# Patient Record
Sex: MALE | URBAN - METROPOLITAN AREA
[De-identification: names, ages, dates, MRNs, and addresses within clinical notes are randomized per-mention and may not be internally consistent; named-entity substitution may affect disease eponyms.]

---

## 2019-03-28 ENCOUNTER — OFFICE VISIT (OUTPATIENT)
Dept: FAMILY MEDICINE | Facility: CLINIC | Age: 44
End: 2019-03-28

## 2019-03-28 VITALS
OXYGEN SATURATION: 99 % | DIASTOLIC BLOOD PRESSURE: 77 MMHG | HEART RATE: 78 BPM | BODY MASS INDEX: 25.65 KG/M2 | SYSTOLIC BLOOD PRESSURE: 122 MMHG | WEIGHT: 139.4 LBS | RESPIRATION RATE: 16 BRPM | HEIGHT: 62 IN | TEMPERATURE: 98.8 F

## 2019-03-28 DIAGNOSIS — J30.2 SEASONAL ALLERGIC RHINITIS: Primary | ICD-10-CM

## 2019-03-28 RX ORDER — FLUTICASONE PROPIONATE 50 MCG
1 SPRAY, SUSPENSION (ML) NASAL DAILY
Qty: 16 G | Refills: 0 | Status: SHIPPED | OUTPATIENT
Start: 2019-03-28

## 2019-03-28 SDOH — HEALTH STABILITY: MENTAL HEALTH: HOW OFTEN DO YOU HAVE A DRINK CONTAINING ALCOHOL?: NEVER

## 2019-03-28 ASSESSMENT — MIFFLIN-ST. JEOR: SCORE: 1413.56

## 2019-03-29 NOTE — PATIENT INSTRUCTIONS
Resumen de la Visita    Nombre del Paciente: Gomez Martínez  MRN: [id number]    Fecha de la [place]: 3/28/2019    Dianostico medico principal: alergias del nariz    Recomendaciones/Instrucciones del Medico: 1) inhalar Flonase og vez en cada fosa nasa en la manana. Sonarse la nariz antes que usa la Flonase use. [place] para 2 semanas o hasta sequita los sintomas.    2) Ravinder un diario de comida para jayson si algunas comidas semolesta. Serecomenda hacer ejercisios, envite el alcohol, refrescos, y simona mucho agua.  Regresa a la clinica si los sintomas empeoran.  Tiene un andrea para jayson el nutriologo rapidamente en el futuro si usted quiere.      Estudios de Laboratorio: ninguna pruebas    Seguimiento/Resultados: Regresa si los sintomas empeoran    Referencias e Instrucciones:  Francisco el exam, encontraron un pedaso de tejido en el parte posterior de la garganta.  La recomendacion es para jayson un especialista.  Llama [facility] para og nadira.  El chris de telefono es [phone] y pregunta sobre servicios ENT (dortor de la garganta, nariz, y orejas) .    Medical Clinicians: Maru John, MS1 and CLARENCE Tao  Medico: Dr. Brittanie Lainez

## 2019-03-29 NOTE — NURSING NOTE
Pt's first visit to clinic. Complains of upper respiratory infection lasting ~8 days with dry cough and runny nose. Has been getting worse over last three days. Nonproductive cough which worsens when laying down, has been propping up his head at night. No fever, no body aches. Complains of headache and throat pain. Also complains of stomach pain and recent constipation lasting ~3 days. Reports that bowel movements hurt. Denies bleeding. Denies recent diet changes. Patient denies any other medical history and takes no medications. He has not been taking any OTC meds for his cold.    Patient moved from [place] at the beginning of March 2019, has only been in [place] for 25 days. He is staying with friends, hasn't found work yet, feels safe where he is staying.     Pt smoked 1 pack a day for 20 years, quit two months ago. Quit drinking 4 years ago. 2-3 cups of coffee a day. Does not use illicit drugs. PHQ-2 of 2, reports that he feels disinterested and sad because he has been so sick.    Anna Draper SN

## 2019-03-29 NOTE — PROGRESS NOTES
"MEDICINE NOTE    SUBJECTIVE:    Gomez Martínez is an otherwise healthy 43-year-old male who presents to clinic today with two concerns: cough and constipation. The patient reports a five year history of intermittent, dry cough that he thinks began after receiving an influenza vaccine. His cough is worse when lying flat, but this does not wake him from sleep. One month ago while in [place], his symptoms worsened, with associated shortness of breath and cervical lymphadenopathy. His symptoms resolved after taking an unknown cough syrup, although he is unsure if this is what alleviated his symptoms. This was quite frightening for him, and he is worried this cough may return. Currently, he is experiencing \"cold-like\" symptoms including nasal congestion, sore throat and headache for the past eight days, which has brought on his cough in the past. He denies fever, myalgias, chest pain, palpitations or shortness of breath at this time. When asked about his concerns, he think this may be due to a change in climate, as he immigrated from [place].     Secondly, the patient complains of abdominal bloating and a feeling of fullness. This has been an ongoing problem, over the past 6-8 years. He thinks this is related to constipation, as he often goes 2-3 days without having a bowel movement. His last bowel movement was this morning, and was normal in color and shape. He denies blood and abdominal pain. He has not experienced any diet changes, describing his daily intake as rice, beans, bread and butter. His primary concern is whether or not this is \"normal.\"    He does have a history of immunizations, although he is unsure what they were.     REVIEW OF SYSTEMS:  Gen: no fevers, night sweats or weight change  Eyes: no vision change, diplopia or red eyes  Ears, Noses, Mouth, Throat: no ringing in ears or hearing change, no epistaxis, no oral lesions, throat clear, + nasal discharge, sore throat  Cardiac: no chest pain, " palpitations, or pain with walking  Lungs: +cough, no dyspnea, or shortness of breath  GI: + bloating, decreased appetite, no nausea, vomiting, diarrhea, no abdominal pain, no abnormal stools, no melena/hematoschezia  : no change in urine, hematuria, or sexual dysfunction  Musculoskeletal: no joint or muscle pain or swelling  Neuro: + headache, no vision changes, gait change  Allergy: no environmental or drug allergies    PAST MEDICAL HISTORY:   Negative    No past surgical history on file.    PAST FAMILY HISTORY:  Grandmother and uncle with a history of hypertension    SOCIAL HISTORY:   Originally from [place], currently living with friends. He feels safe at his current living situation. 20 pack year history, stopped smoking two months ago. No alcohol use. No illicit drug use.     ASSESSMENT/PLAN:  Gomez is an otherwise healthy 43-year-old male, who recently immigrated from [place], who presents to clinic today with concerns of cough and constipation. The patient describes an intermittent, dry cough that is exacerbated with cold symptoms. Exam is consistent with allergic rhinitis, and fluticasone was prescribed for qAM use.     We discussed with Gomez that the frequency of his bowel movements is normal, and the bloating is likely lifestyle related. Encouraged he keep a dietary journal, and discussed the benefits of vegetables, hydration and exercise.     Of note, there was an atypical mass noted in the right oropharynx on exam. This was discussed with the patient, and he was advised to follow-up with ENT. [facility] resources were provided. Reassured him that this may be benign, but follow-up is recommended.     Patient's smoking cessation was celebrated. Discussed that we have resources available should he relapse.     Gomez was seen today for cough.    Diagnoses and all orders for this visit:    Seasonal allergic rhinitis  -     fluticasone (FLONASE) 50 MCG/ACT nasal spray; Spray 1 spray into both nostrils  daily        Med Clinician: Maru Hansen  Preceptor: Dr. Brittanie Lainez    In supervising the medical student, I repeated the exam documented above.  I have reviewed and verified the student s documentation.  Supervising Provider: Dr. Brittanie Lainez

## 2019-03-31 NOTE — PROGRESS NOTES
"[facility] Progress Note    Chief complaint: RUFINA, a 43 year old male, presents to [facility] for assessment of cough and constipation.     Subjective:  Condition 1: cold like symptoms  RUFINA presents to [facility] with cold like symptoms including headache, runny nose, nasal congestion, and sore throat that started about 8 days ago. Patient reports that he is worried that this will develop into a cough, which he has experienced before and would like to prevent. He reports getting a dry cough that is worse at night and requires him to sleep with his head up. He does not currently have a cough. He is worried that it may develop into a cough that he experienced before which made him short of breath and lasted for 22 days. The symptoms and cough comes and goes and typically resolves on its own. Patient reports taking a cough syrup before (name unknown) that also helped at times. Today, patient denies chest pain and shortness of breath.     Condition 2: Constipation  Patient presents to clinic with a history of feeling bloated and a feeling of fullness daily (has been experiencing for about 6-8 years). He typically has a bowel movement everyday, with normal shape and color. However, he does report times where he does not have a bowel movement for 2-3 days. Patient denies abdominal pain. Patient's diet consists of rice, beans, butter, and bread. His primary concern is whether the bloating/fullness and bowel movements are normal. The patient's last bowel movement was this morning.     Current Outpatient Medications   Medication Sig Dispense Refill     fluticasone (FLONASE) 50 MCG/ACT nasal spray Spray 1 spray into both nostrils daily 16 g 0         Objective:   /77 (BP Location: Right arm, Patient Position: Chair, Cuff Size: Adult Regular)   Pulse 78   Temp 98.8  F (37.1  C)   Resp 16   Ht 1.57 m (5' 1.81\")   Wt 63.2 kg (139 lb 6.4 oz)   SpO2 99%   BMI 25.65 kg/m      Assessment:     Condition 1- Cold like symptoms  DTP: " Indication - needs additional drug therapy - patient needs additional drug therapy to treat his post nasal drip (allergic rhinitis)  Rationale: The patient's symptoms of runny nose, cough that worsens at night, and sore throat may indicate that the patient is experiencing effects of post nasal drip.  Patient may benefit from a nasal decongestant such as Sudafed or a nasal corticosteroid such as Flonase.    Condition 2- Constipation  DTP: No drug therapy problem at this time.   Rationale: The patient's bowel movement is normal as he typically has bowel movement daily and reports normal shape and color. The bloated/full feeling may be due to the patient's diet, and thus he may benefit from making diet modifications. He could take note of what he eats and which foods cause the bloating/fullness feeling.     Plan:  1. Initiate fluticasone (Flonase) 50 mcg/act spray - Spray 1 spray into each nostril once daily.    2. Recommend that the patient keep a log of what he is eating to identify possible causes of the bloating/fullness feeling. Recommend to limit consumption of these foods.     Pharmacy Follow-Up Plan (Method, Date, Parameters):   Follow up in 2 weeks over the phone to evaluate safety and efficacy of flonase nasal spray.     Safety: Evaluate for side effects of fluticasone nasal spray such as nausea, headache, nosebleed, and throat irritation.   Effectiveness: Evaluate for effectiveness of fluticasone nasal spray by assessing for resolution of runny nose and cough.     PharmCare Clinician: Catalina Dowd  Pharmacy Preceptor: Sheridan Verduzco    _____________________________  Preceptor Use Only:  In supervising the student, I have reviewed and verified the student's documentation and found it to be correct and complete.   Preceptor Signature: Sheridan Verduzco